# Patient Record
Sex: MALE | Race: OTHER | NOT HISPANIC OR LATINO | URBAN - METROPOLITAN AREA
[De-identification: names, ages, dates, MRNs, and addresses within clinical notes are randomized per-mention and may not be internally consistent; named-entity substitution may affect disease eponyms.]

---

## 2018-11-05 ENCOUNTER — EMERGENCY (EMERGENCY)
Facility: HOSPITAL | Age: 38
LOS: 1 days | Discharge: HOME | End: 2018-11-05
Admitting: EMERGENCY MEDICINE
Payer: OTHER MISCELLANEOUS

## 2018-11-05 VITALS
RESPIRATION RATE: 17 BRPM | TEMPERATURE: 98 F | HEART RATE: 107 BPM | SYSTOLIC BLOOD PRESSURE: 136 MMHG | DIASTOLIC BLOOD PRESSURE: 81 MMHG | OXYGEN SATURATION: 96 %

## 2018-11-05 DIAGNOSIS — S29.012A STRAIN OF MUSCLE AND TENDON OF BACK WALL OF THORAX, INITIAL ENCOUNTER: ICD-10-CM
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
## 2018-11-05 DIAGNOSIS — Z79.899 OTHER LONG TERM (CURRENT) DRUG THERAPY: ICD-10-CM

## 2018-11-05 DIAGNOSIS — M25.511 PAIN IN RIGHT SHOULDER: ICD-10-CM
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
## 2018-11-05 DIAGNOSIS — M54.2 CERVICALGIA: ICD-10-CM

## 2018-11-05 DIAGNOSIS — Z79.1 LONG TERM (CURRENT) USE OF NON-STEROIDAL ANTI-INFLAMMATORIES (NSAID): ICD-10-CM
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
## 2018-11-05 PROCEDURE — 99283 EMERGENCY DEPT VISIT LOW MDM: CPT

## 2018-11-05 RX ORDER — DIAZEPAM 5 MG
10 TABLET ORAL ONCE
Qty: 0 | Refills: 0 | Status: DISCONTINUED | OUTPATIENT
Start: 2018-11-05 | End: 2018-11-05

## 2018-11-05 RX ORDER — IBUPROFEN 200 MG
1 TABLET ORAL
Qty: 28 | Refills: 0 | OUTPATIENT
Start: 2018-11-05 | End: 2018-11-11

## 2018-11-05 RX ORDER — IBUPROFEN 200 MG
600 TABLET ORAL ONCE
Qty: 0 | Refills: 0 | Status: COMPLETED | OUTPATIENT
Start: 2018-11-05 | End: 2018-11-05

## 2018-11-05 RX ORDER — DIAZEPAM 5 MG
1 TABLET ORAL
Qty: 15 | Refills: 0 | OUTPATIENT
Start: 2018-11-05

## 2018-11-05 RX ADMIN — Medication 10 MILLIGRAM(S): at 17:30

## 2018-11-05 RX ADMIN — Medication 600 MILLIGRAM(S): at 17:30

## 2018-11-05 NOTE — ED PROVIDER NOTE - MEDICAL DECISION MAKING DETAILS
39 y/o M presents to ED c/o R shoulder pain 37 y/o M presents to ED c/o R shoulder pain.  Exam consistent with muscle spasm.  Pt Rx ibuprofen and Valium.  Pt advised ROM exercises of neck and shoulder.  Pt will f/u with occupational health before returning to work. 39 y/o M presents to ED c/o R shoulder pain.  Exam consistent with muscle spasm of trapezius.  Pt Rx ibuprofen and Valium.  Pt advised ROM exercises of neck and shoulder.  Pt will f/u with occupational health before returning to work.

## 2018-11-05 NOTE — ED PROVIDER NOTE - NSFOLLOWUPINSTRUCTIONS_ED_ALL_ED_FT
Take muscle relaxant as needed.  This medication is sedating.  Do not drive or operate machinery with this medication.     Take ibuprofen.  Apply warm compresses 4-5 times per day.  Range the shoulder and neck as discussed.    Follow up with occupational  health

## 2018-11-05 NOTE — ED PROVIDER NOTE - OBJECTIVE STATEMENT
39 y/o right-handed male with no significant PMHx present to ED c/o right shoulder pain. Patient works as a  and was swinging a bar 2 hours PTA while at work when the bar caught on a beam behind him, pulling his right shoulder. He reports right shoulder pain with radiation to the right side of the neck since then, with no associated numbness or tingling. Patient denies allergies to any meds.

## 2018-11-05 NOTE — ED ADULT NURSE NOTE - NSIMPLEMENTINTERV_GEN_ALL_ED
Implemented All Universal Safety Interventions:  [place] to call system. Call bell, personal items and telephone within reach. Instruct patient to call for assistance. Room bathroom lighting operational. Non-slip footwear when patient is off stretcher. Physically safe environment: no spills, clutter or unnecessary equipment. Stretcher in lowest position, wheels locked, appropriate side rails in place.

## 2024-03-04 NOTE — ED PROVIDER NOTE - NS ED ACP SCRIBE NAME FT
[FreeTextEntry1] : Mr. HONEYCUTT is a 47-year-old man with chronic hepatitis B, who saw Dr. Herndon until 2022.  - hepatitis B: on tenofovir since ~2010, LFTs were reportedly elevated, switched to tenofovir TAF in 2021.  - LFTs normal - HCC screening: US 3/2023 normal exam.  - transient rectal bleed, likely hemorrhoidal in 2021. Had hemorrhoids on exam 9/2022, but given chronicity, colonoscopy is warranted.  - BMI elevated purely due to muscles  Plan: - repeat bloodwork and US before next visit in 6 months - hematochezia, colon cancer screening: colonoscopy [facility]
